# Patient Record
Sex: MALE | Race: WHITE | NOT HISPANIC OR LATINO | Employment: OTHER | URBAN - METROPOLITAN AREA
[De-identification: names, ages, dates, MRNs, and addresses within clinical notes are randomized per-mention and may not be internally consistent; named-entity substitution may affect disease eponyms.]

---

## 2020-01-10 ENCOUNTER — HOSPITAL ENCOUNTER (EMERGENCY)
Facility: HOSPITAL | Age: 35
Discharge: HOME | End: 2020-01-11
Attending: EMERGENCY MEDICINE

## 2020-01-10 DIAGNOSIS — M79.644 PAIN OF RIGHT THUMB: Primary | ICD-10-CM

## 2020-01-10 PROCEDURE — 99284 EMERGENCY DEPT VISIT MOD MDM: CPT | Mod: 25,ER

## 2020-01-11 VITALS
OXYGEN SATURATION: 98 % | HEART RATE: 67 BPM | RESPIRATION RATE: 17 BRPM | SYSTOLIC BLOOD PRESSURE: 120 MMHG | DIASTOLIC BLOOD PRESSURE: 83 MMHG | TEMPERATURE: 99 F | WEIGHT: 176.25 LBS

## 2020-01-11 PROCEDURE — 25000003 PHARM REV CODE 250: Mod: ER | Performed by: EMERGENCY MEDICINE

## 2020-01-11 RX ORDER — PROMETHAZINE HYDROCHLORIDE 25 MG/1
25 TABLET ORAL EVERY 6 HOURS PRN
Qty: 10 TABLET | Refills: 0 | Status: SHIPPED | OUTPATIENT
Start: 2020-01-11

## 2020-01-11 RX ORDER — HYDROCODONE BITARTRATE AND ACETAMINOPHEN 10; 325 MG/1; MG/1
1 TABLET ORAL
Status: COMPLETED | OUTPATIENT
Start: 2020-01-11 | End: 2020-01-11

## 2020-01-11 RX ORDER — HYDROCODONE BITARTRATE AND ACETAMINOPHEN 5; 325 MG/1; MG/1
1 TABLET ORAL EVERY 4 HOURS PRN
Qty: 15 TABLET | Refills: 0 | Status: SHIPPED | OUTPATIENT
Start: 2020-01-11

## 2020-01-11 RX ADMIN — HYDROCODONE BITARTRATE AND ACETAMINOPHEN 1 TABLET: 10; 325 TABLET ORAL at 12:01

## 2020-01-11 NOTE — ED NOTES
Pt presents to ED c/o R thumb pain/swelling. Pt sts he was on his dock earlier today and slipped and fell. Reports that he broke his R hand back in August and hasn't had any problems but that he wanted to come get checked out. Reports pain/swelling to R thumb with decreased ROM.  AAOx4, nad. Skin warm, dry. Resp even, unlabored. Afebrile.

## 2020-01-11 NOTE — ED PROVIDER NOTES
Encounter Date: 1/10/2020       History     Chief Complaint   Patient presents with    Hand Pain     fell and hurt R thumb, decreased ROM     Patient is a 34-year-old male who presents today with complaints of acute right thumb pain. Patient had a fall just prior to arrival because injury. He states that his hand was outstretched during the fall and his right thumb hit up against his outdoor deck during the fall.  He states that his thumb got bent backwards.  He denies pain anywhere else except his right thumb.  Patient has swelling and pain that right thumb.  Symptoms are constant in duration and moderate in severity.  Made worse with palpation and movement.        Review of patient's allergies indicates:  No Known Allergies  History reviewed. No pertinent past medical history.  History reviewed. No pertinent surgical history.  History reviewed. No pertinent family history.  Social History     Tobacco Use    Smoking status: Current Every Day Smoker     Types: Cigarettes    Smokeless tobacco: Never Used   Substance Use Topics    Alcohol use: Yes    Drug use: Never     Review of Systems     Constitutional: No fevers, no fatigue  HENT: No headache, no facial pain, no hearing loss  Eyes: No vision changes, no eye pain  Neck/Back: No neck pain, no back pain  Cardiovascular: No chest pain, no palpitations, no syncope  Respiratory: no SOB, no cough  Abdominal: no abdominal pain, no N/V  Genitourinary: no pelvic pain, no genital pain  Musculoskeletal: R thumb pain/swelling  Neurological: No numbness, no paresthesias, no weakness, no LOC    Physical Exam     Initial Vitals [01/10/20 2259]   BP Pulse Resp Temp SpO2   (!) 166/84 86 16 97.5 °F (36.4 °C) 98 %      MAP       --         Physical Exam     Constitutional: Awake, alert, NAD  HENT: normocephalic, no facial bone tenderness, no evidence of basilar skull fx  Eyes: PERRL, EOM, normal conjunctiva  Neck: Trachea midline, nontender, full ROM  Cardiovascular: RRR, 2+  palpable pulses in all 4 extremities  Pulmonary: Non-labored respirations, equal bilateral breath sounds, LCTAB  Chest Wall: No tenderness, no deformity  Abdominal: Soft, nontender, nondistended  Back: Nontender, no step-offs  Musculoskeletal:  Significant swelling of the right thumb; patient's primary spot of tenderness is on the ulnar side of the MCP joint; week pincher grasp with limited thumb opposition  Neurological: AAO x4, GCS 15, maintaining airway and answering questions appropriately, no focal deficits  Skin:  No lacerations      ED Course   Procedures  Labs Reviewed - No data to display       Imaging Results          X-Ray Finger 2 or More Views Right (Final result)  Result time 01/10/20 23:52:22    Final result by Buster Thomas III, MD (01/10/20 23:52:22)                 Impression:      Negative.      Electronically signed by: Buster Thomas MD  Date:    01/10/2020  Time:    23:52             Narrative:    EXAMINATION:  XR FINGER 2 OR MORE VIEWS RIGHT    CLINICAL HISTORY:  right thumb pain;    COMPARISON:  None    FINDINGS:  Degenerative change primarily along the 1st metacarpophalangeal joint.  No acute fracture.  No dislocation.  No suspicious lytic or blastic change.                              Medications   HYDROcodone-acetaminophen  mg per tablet 1 tablet (has no administration in time range)          Medical Decision Makin-year-old male who presented with complaints of acute right thumb pain; significant swelling and tenderness on exam; x-ray within normal limits; I explained to the patient that I still believe he has a significant injury that requires urgent orthopedic follow-up; I discussed my possible concern of an ulnar collateral ligament injury; patient is right-hand dominant; patient has no insurance; referral was sent to [facility] and request was made that patient be seen within 1 week; thumb spica placed and patient discharged home; ED return precautions  discussed; patient advised not to drive or operate heavy machinery while taking prescription pain medication       Medication List      START taking these medications    HYDROcodone-acetaminophen 5-325 mg per tablet  Commonly known as:  NORCO  Take 1 tablet by mouth every 4 (four) hours as needed for Pain.     promethazine 25 MG tablet  Commonly known as:  PHENERGAN  Take 1 tablet (25 mg total) by mouth every 6 (six) hours as needed for Nausea.           Where to Get Your Medications      You can get these medications from any pharmacy    Bring a paper prescription for each of these medications  · HYDROcodone-acetaminophen 5-325 mg per tablet  · promethazine 25 MG tablet                                      Clinical Impression:   Final diagnoses:  [M79.644] Pain of right thumb (Primary)      Disposition:   Disposition: Discharged  Condition: Stable                     Buster Franklin MD  01/11/20 0056       Buster Franklin MD  01/11/20 0056

## 2020-01-11 NOTE — ED NOTES
Thumb spica applied by ABIODUN Medina Splint verified by Dr. Franklin. Pt tolerated. Pt states no further needs/concerns. resp even, unlabored. No distress noted. Pt AAOx3. Will d/c per MD order. [facility] referral, images of x-rays, and patient's chart faxed to [facility]. Copy of referral and CD of x-ray images given to patient.

## 2021-04-30 ENCOUNTER — HOSPITAL ENCOUNTER (EMERGENCY)
Facility: HOSPITAL | Age: 36
Discharge: HOME | End: 2021-04-30
Attending: EMERGENCY MEDICINE
Payer: MEDICAID

## 2021-04-30 VITALS
DIASTOLIC BLOOD PRESSURE: 94 MMHG | RESPIRATION RATE: 18 BRPM | HEART RATE: 76 BPM | TEMPERATURE: 98 F | SYSTOLIC BLOOD PRESSURE: 145 MMHG | HEIGHT: 70 IN | OXYGEN SATURATION: 98 % | BODY MASS INDEX: 25.79 KG/M2 | WEIGHT: 180.13 LBS

## 2021-04-30 DIAGNOSIS — Z72.0 TOBACCO USE: ICD-10-CM

## 2021-04-30 DIAGNOSIS — B34.9 VIRAL SYNDROME: Primary | ICD-10-CM

## 2021-04-30 LAB
CTP QC/QA: YES
SARS-COV-2 RDRP RESP QL NAA+PROBE: NEGATIVE

## 2021-04-30 PROCEDURE — 99282 EMERGENCY DEPT VISIT SF MDM: CPT | Mod: 25,ER

## 2021-04-30 PROCEDURE — U0002 COVID-19 LAB TEST NON-CDC: HCPCS | Mod: ER | Performed by: EMERGENCY MEDICINE

## 2025-07-25 ENCOUNTER — HOSPITAL ENCOUNTER (EMERGENCY)
Facility: HOSPITAL | Age: 40
Discharge: HOME | End: 2025-07-25
Attending: EMERGENCY MEDICINE
Payer: MEDICAID

## 2025-07-25 VITALS
SYSTOLIC BLOOD PRESSURE: 153 MMHG | TEMPERATURE: 98 F | HEART RATE: 72 BPM | RESPIRATION RATE: 20 BRPM | WEIGHT: 150.69 LBS | DIASTOLIC BLOOD PRESSURE: 95 MMHG | OXYGEN SATURATION: 98 % | HEIGHT: 70 IN | BODY MASS INDEX: 21.57 KG/M2

## 2025-07-25 DIAGNOSIS — K04.7 DENTAL INFECTION: Primary | ICD-10-CM

## 2025-07-25 PROCEDURE — 25000003 PHARM REV CODE 250: Mod: ER | Performed by: EMERGENCY MEDICINE

## 2025-07-25 PROCEDURE — 99284 EMERGENCY DEPT VISIT MOD MDM: CPT | Mod: ER

## 2025-07-25 RX ORDER — CLINDAMYCIN HYDROCHLORIDE 300 MG/1
300 CAPSULE ORAL 4 TIMES DAILY
Qty: 40 CAPSULE | Refills: 0 | Status: SHIPPED | OUTPATIENT
Start: 2025-07-25 | End: 2025-08-04

## 2025-07-25 RX ORDER — NAPROXEN 500 MG/1
500 TABLET ORAL 2 TIMES DAILY WITH MEALS
Qty: 60 TABLET | Refills: 0 | Status: SHIPPED | OUTPATIENT
Start: 2025-07-25

## 2025-07-25 RX ORDER — CLINDAMYCIN HYDROCHLORIDE 150 MG/1
300 CAPSULE ORAL
Status: COMPLETED | OUTPATIENT
Start: 2025-07-25 | End: 2025-07-25

## 2025-07-25 RX ORDER — NAPROXEN 500 MG/1
500 TABLET ORAL
Status: COMPLETED | OUTPATIENT
Start: 2025-07-25 | End: 2025-07-25

## 2025-07-25 RX ADMIN — NAPROXEN 500 MG: 500 TABLET ORAL at 11:07

## 2025-07-25 RX ADMIN — CLINDAMYCIN HYDROCHLORIDE 300 MG: 150 CAPSULE ORAL at 11:07

## 2025-07-26 NOTE — ED PROVIDER NOTES
Encounter Date: 7/25/2025       History     Chief Complaint   Patient presents with    Dental Pain     Abscess to right bottom tooth x 10 days. Just finished antibiotics. Dentist apt not until August 11th.       The history is provided by the patient.   Dental Pain  The primary symptoms include mouth pain. Primary symptoms do not include fever, shortness of breath or sore throat. The symptoms began several weeks ago. The symptoms are waxing and waning. The symptoms are chronic. The symptoms occur frequently.   Affected locations include: teeth, cheek(s) and gum(s). At its highest the mouth pain was at 5/10. The mouth pain is currently at 5/10.   Additional symptoms include: gum swelling and facial swelling. Additional symptoms do not include: purulent gums, trismus and trouble swallowing.     Review of patient's allergies indicates:  No Known Allergies  No past medical history on file.  Past Surgical History:   Procedure Laterality Date    LEG SURGERY Right 2008     No family history on file.  Social History[1]  Review of Systems   Constitutional:  Negative for fever.   HENT:  Positive for facial swelling. Negative for sore throat and trouble swallowing.    Respiratory:  Negative for shortness of breath.    Cardiovascular:  Negative for chest pain.   Gastrointestinal:  Negative for nausea.   Genitourinary:  Negative for dysuria.   Musculoskeletal:  Negative for back pain.   Skin:  Negative for rash.   Neurological:  Negative for weakness.   Hematological:  Does not bruise/bleed easily.       Physical Exam     Initial Vitals [07/25/25 2243]   BP Pulse Resp Temp SpO2   (!) 153/95 72 20 98.4 °F (36.9 °C) 98 %      MAP       --         Physical Exam    Nursing note and vitals reviewed.  Constitutional: He appears well-developed and well-nourished.   HENT:   Head: Normocephalic and atraumatic. Mouth/Throat: Oropharynx is clear and moist. Abnormal dentition. Dental abscesses and dental caries present.   Eyes: Conjunctivae  and EOM are normal. Pupils are equal, round, and reactive to light.   Neck: Neck supple.   Normal range of motion.  Cardiovascular:  Normal rate, regular rhythm and normal heart sounds.           Pulmonary/Chest: Breath sounds normal.   Abdominal: Abdomen is soft. Bowel sounds are normal.   Musculoskeletal:         General: Normal range of motion.      Cervical back: Normal range of motion and neck supple.     Neurological: He is alert and oriented to person, place, and time. He has normal strength.   Skin: Skin is warm and dry.   Psychiatric: He has a normal mood and affect. Thought content normal.         ED Course   Procedures  Labs Reviewed   HEPATITIS C ANTIBODY   HEP C VIRUS HOLD SPECIMEN   HIV 1 / 2 ANTIBODY          Imaging Results    None     11:09 PM - Counseling: Spoke with the patient and discussed todays findings, in addition to providing specific details for the plan of care and counseling regarding the diagnosis and prognosis. Questions are answered at this time.        Medications   clindamycin capsule 300 mg (300 mg Oral Given 7/25/25 2302)   naproxen tablet 500 mg (500 mg Oral Given 7/25/25 2302)     Medical Decision Making  Ddx dental abscess, cellulitis, caries    Problems Addressed:  Dental infection: chronic illness or injury with exacerbation, progression, or side effects of treatment    Risk  Prescription drug management.            11:10 PM - Counseling: Spoke with the patient and discussed todays findings, in addition to providing specific details for the plan of care and counseling regarding the diagnosis and prognosis. Questions are answered at this time.                                 Clinical Impression:  Final diagnoses:  [K04.7] Dental infection (Primary)          ED Disposition Condition    Discharge Stable          ED Prescriptions       Medication Sig Dispense Start Date End Date Auth. Provider    clindamycin (CLEOCIN) 300 MG capsule Take 1 capsule (300 mg total) by mouth 4  (four) times daily. for 10 days 40 capsule 7/25/2025 8/4/2025 Flex Lopes MD    naproxen (NAPROSYN) 500 MG tablet Take 1 tablet (500 mg total) by mouth 2 (two) times daily with meals. 60 tablet 7/25/2025 -- Flex Lopes MD          Follow-up Information       Follow up With Specialties Details Why Contact Info    Veronika Basurto, NP Family Medicine Schedule an appointment as soon as possible for a visit in 2 days  [address] [place] [address]   [facility] [address]  [phone]                     [1]   Social History  Tobacco Use    Smoking status: Every Day     Types: Cigarettes    Smokeless tobacco: Never   Substance Use Topics    Alcohol use: Yes    Drug use: Never        Flex Lopes MD  07/25/25 6331